# Patient Record
Sex: FEMALE | Race: WHITE
[De-identification: names, ages, dates, MRNs, and addresses within clinical notes are randomized per-mention and may not be internally consistent; named-entity substitution may affect disease eponyms.]

---

## 2019-04-02 ENCOUNTER — HOSPITAL ENCOUNTER (OUTPATIENT)
Dept: HOSPITAL 74 - JASU-SURG | Age: 44
Discharge: HOME | End: 2019-04-02
Attending: OBSTETRICS & GYNECOLOGY
Payer: COMMERCIAL

## 2019-04-02 VITALS — BODY MASS INDEX: 27.3 KG/M2

## 2019-04-02 VITALS — TEMPERATURE: 97.5 F

## 2019-04-02 VITALS — DIASTOLIC BLOOD PRESSURE: 84 MMHG | HEART RATE: 96 BPM | SYSTOLIC BLOOD PRESSURE: 122 MMHG

## 2019-04-02 DIAGNOSIS — N73.1: Primary | ICD-10-CM

## 2019-04-02 DIAGNOSIS — N73.6: ICD-10-CM

## 2019-04-02 DIAGNOSIS — N70.11: ICD-10-CM

## 2019-04-02 PROCEDURE — 0UB64ZZ EXCISION OF LEFT FALLOPIAN TUBE, PERCUTANEOUS ENDOSCOPIC APPROACH: ICD-10-PCS | Performed by: OBSTETRICS & GYNECOLOGY

## 2019-04-02 PROCEDURE — 0DNW4ZZ RELEASE PERITONEUM, PERCUTANEOUS ENDOSCOPIC APPROACH: ICD-10-PCS | Performed by: OBSTETRICS & GYNECOLOGY

## 2019-04-02 NOTE — HP
History & Physical Update





- Physical


Physical: No Change





- Assessment


Assessment: No Change





- Plan


Plan: No Change (H&P reviwed , no changes . for laparoscopy LT salpingectomy, 

all necessary procedure)

## 2019-04-03 NOTE — PATH
Surgical Pathology Report



Patient Name:  ALAN MUÑOZ

Accession #:  [id number]

Med. Rec. #:  [id number]                                                        

   /Age/Gender:  1975 (Age: 43) / F

Account:  [id number]                                                          

             Location: [facility]

Taken:  2019

Received:  2019

Reported:  4/3/2019

Physicians:  Pepe Fam M.D.

  



Specimen(s) Received

 PORTION OF LEFT FALLOPIAN TUBE 





Clinical History

Pelvic and perineal pain







Final Diagnosis

PORTION OF FALLOPIAN TUBE, LEFT, LAPAROSCOPIC SALPINGECTOMY:

FIMBRIATED END OF FALLOPIAN TUBE WITH DILATED LUMEN CONSISTENT WITH

HYDROSALPINX, DENSE FIBROUS ADHESIONS, AND ADHERENT SMALL PORTION OF OVARIAN

TISSUE.





***Electronically Signed***

Esperanza Bolden M.D.





Gross Description

Received in formalin labeled "portion of left fallopian tube," is a 3 cm in

length fimbriated fallopian tube. The outer surface is tan-pink and show focal

adhesions. Sectioning reveals an unremarkable lumen. Representative sections are

submitted in one cassette.

/2019



[facility]

## 2019-04-03 NOTE — OP
DATE OF OPERATION:  04/02/2019

 

PREOPERATIVE DIAGNOSIS:  Left lower quadrant pain, rule out hydrosalpinx.

 

POSTOPERATIVE DIAGNOSES:

1.  Left lower quadrant pain, rule out hydrosalpinx.

2.  Chronic pelvic inflammatory disease.

3.  Pelvic adhesions.

 

PROCEDURE:  Laparoscopy, lysis of pelvic adhesions, and left salpingectomy.

 

SURGEON:  Pepe Fam MD

 

FIRST ASSIST:  Paula Martin MD

 

ANESTHESIA:  General.

 

ESTIMATED BLOOD LOSS:  50 mL.

 

OPERATIVE PROCEDURE:  The patient was taken to the operating room and adequate

general anesthesia induced.  In the lithotomy position, evaluation under anesthesia

revealed external genitalia to be normal.  Vagina was normal.  Cervix was clean, no

gross lesion.  Uterus was normal size.  Adnexa, no masses were palpable.  Then, with

the weighted speculum in the vagina, anterior lip of cervix was grasped with a

single-toothed tenaculum.  Hulka was introduced into the uterine cavity for

manipulation then Kendrick was inserted, and patient was placed in dorsal lithotomy

position for pelviscopy.  A small infraumbilical incision was made.  Veress needle

was introduced.  Pneumoperitoneum was established.  A 5-mm trocar was inserted

through the umbilical area under direct vision, and then scope was introduced.  Then

again, under direct vision, two 5-mm trocars, one on the right hypogastric area and

the other one in the left hypogastric area, was introduced without any difficulty. 

Upper abdomen was checked and was normal.  There was a pelvic area.  There was a

sigmoid colon adherent to the posterior cul-de-sac and posterior wall of the uterus. 

Also, the colon was adherent to the left pelvic sidewall covering the left tube.  The

ovary was underneath the bowel adhesions.  The right tube and ovary appeared to be

normal.  Then, meticulously these bowel adhesions were released from the posterior

cul-de-sac and the posterior wall of the uterus also with Endoshears with sharp and

blunt dissection meticulously these adhesions again were lysed from the pelvic

sidewall, and the bowel were released then the tube was exposed.  The left tube

appeared to be swollen and enlarged consistent with hydrosalpinx.  So, at this time,

the fimbriated end of the tube was buried under the pelvic sidewall.  The adhesions

were lysed, and the fimbria was released, and then mesosalpinx was cauterized with

Bipolar cautery along the mesosalpinx, and the left tube was removed.  There was a

slight amount of bleeding at the site of the procedure which was cauterized with

bipolar cautery.  Hemostasis was established.  Pelvic cavity was irrigated several

times, and no active bleeding was seen.  The left and the right ovary were normal. 

Surgicel was placed over the surgical site.  Hemostasis was complete.  Abdomen was

emptied of all the gases.  Port incisions were closed with interrupted sutures of 3-0

Vicryl and skin was closed with Dermabond glue.  The patient tolerated the procedure

well, left the OR in good condition.

 

 

CAMILLA [id number]

DD: 04/03/2019 10:23

DT: 04/03/2019 11:04

Job #:  [id number]